# Patient Record
Sex: FEMALE | Race: WHITE | NOT HISPANIC OR LATINO | Employment: STUDENT | URBAN - METROPOLITAN AREA
[De-identification: names, ages, dates, MRNs, and addresses within clinical notes are randomized per-mention and may not be internally consistent; named-entity substitution may affect disease eponyms.]

---

## 2021-01-18 ENCOUNTER — IMMUNIZATIONS (OUTPATIENT)
Dept: FAMILY MEDICINE CLINIC | Facility: HOSPITAL | Age: 21
End: 2021-01-18

## 2021-01-18 DIAGNOSIS — Z23 ENCOUNTER FOR IMMUNIZATION: Primary | ICD-10-CM

## 2021-01-18 PROCEDURE — 91300 SARS-COV-2 / COVID-19 MRNA VACCINE (PFIZER-BIONTECH) 30 MCG: CPT

## 2021-01-18 PROCEDURE — 0001A SARS-COV-2 / COVID-19 MRNA VACCINE (PFIZER-BIONTECH) 30 MCG: CPT

## 2021-02-08 ENCOUNTER — IMMUNIZATIONS (OUTPATIENT)
Dept: FAMILY MEDICINE CLINIC | Facility: HOSPITAL | Age: 21
End: 2021-02-08

## 2021-02-08 DIAGNOSIS — Z23 ENCOUNTER FOR IMMUNIZATION: Primary | ICD-10-CM

## 2021-02-08 PROCEDURE — 91300 SARS-COV-2 / COVID-19 MRNA VACCINE (PFIZER-BIONTECH) 30 MCG: CPT

## 2021-02-08 PROCEDURE — 0002A SARS-COV-2 / COVID-19 MRNA VACCINE (PFIZER-BIONTECH) 30 MCG: CPT

## 2022-03-22 ENCOUNTER — OFFICE VISIT (OUTPATIENT)
Dept: URGENT CARE | Facility: CLINIC | Age: 22
End: 2022-03-22
Payer: COMMERCIAL

## 2022-03-22 VITALS
OXYGEN SATURATION: 99 % | TEMPERATURE: 98.2 F | RESPIRATION RATE: 18 BRPM | BODY MASS INDEX: 23.3 KG/M2 | SYSTOLIC BLOOD PRESSURE: 107 MMHG | HEART RATE: 75 BPM | HEIGHT: 66 IN | WEIGHT: 145 LBS | DIASTOLIC BLOOD PRESSURE: 62 MMHG

## 2022-03-22 DIAGNOSIS — S01.339A COMPLICATION OF EAR PIERCING, UNSPECIFIED LATERALITY, INITIAL ENCOUNTER: Primary | ICD-10-CM

## 2022-03-22 PROCEDURE — 99213 OFFICE O/P EST LOW 20 MIN: CPT | Performed by: PHYSICIAN ASSISTANT

## 2022-03-22 RX ORDER — DEXTROAMPHETAMINE SACCHARATE, AMPHETAMINE ASPARTATE, DEXTROAMPHETAMINE SULFATE AND AMPHETAMINE SULFATE 1.25; 1.25; 1.25; 1.25 MG/1; MG/1; MG/1; MG/1
TABLET ORAL
COMMUNITY
Start: 2022-03-11

## 2022-03-22 RX ORDER — NORETHINDRONE ACETATE AND ETHINYL ESTRADIOL, ETHINYL ESTRADIOL AND FERROUS FUMARATE 1MG-10(24)
KIT ORAL
COMMUNITY
Start: 2022-01-21

## 2022-03-22 RX ORDER — CEPHALEXIN 500 MG/1
500 CAPSULE ORAL EVERY 8 HOURS SCHEDULED
Qty: 21 CAPSULE | Refills: 0 | Status: SHIPPED | OUTPATIENT
Start: 2022-03-22 | End: 2022-03-29

## 2022-03-22 RX ORDER — KETOCONAZOLE 20 MG/ML
SHAMPOO TOPICAL
COMMUNITY
Start: 2022-01-25

## 2022-03-22 RX ORDER — LISDEXAMFETAMINE DIMESYLATE 30 MG/1
30 CAPSULE ORAL EVERY MORNING
COMMUNITY
Start: 2022-02-26

## 2022-03-25 NOTE — PROGRESS NOTES
[facility]        NAME: Yudelka Powers is a 24 y o  female  : 2000    MRN: [id number]  DATE:  2022  TIME: 9:15 AM    Assessment and Plan   Complication of ear piercing, unspecified laterality, initial encounter [S01 339A]  1  Complication of ear piercing, unspecified laterality, initial encounter  mupirocin (BACTROBAN) 2 % ointment    cephalexin (KEFLEX) 500 mg capsule         Patient Instructions     Patient appears to have ear piercing infection bilaterally  I prescribed her oral and topical antibiotics  If condition does not resolve with treatment, then she may need to remove piercings  Should be re-evaluated if any further complications arise  Follow up with PCP in 3-5 days  Proceed to  ER if symptoms worsen  Chief Complaint     Chief Complaint   Patient presents with    Ear Swelling     started 1 week ago, b/l ears, possible infected pericing, slight redness and swelling, lumps noticed n back of ears, no draiange, pt is cleaning with antiseptic washes, no internal pain, slightly itchy at this time on outside of ears          History of Present Illness       Patient presents with one-week history of which she reports are lumps behind her ears that are red, tender, and swollen  She is concerned about possible piercing infection  She has had the piercings for a while  She denies fever chills or any other significant symptoms at this time  She has been using an antiseptic wash to clean the area  Review of Systems   Review of Systems   Constitutional: Negative  HENT:        Bilateral external ear swelling   Respiratory: Negative  Cardiovascular: Negative  Gastrointestinal: Negative  Genitourinary: Negative            Current Medications       Current Outpatient Medications:     amphetamine-dextroamphetamine (ADDERALL) 5 MG tablet, , Disp: , Rfl:     Lo Loestrin Fe 1 MG-10 MCG / 10 MCG TABS, , Disp: , Rfl:     Vyvanse 30 MG capsule, Take 30 mg by mouth every morning, Disp: , Rfl:     cephalexin (KEFLEX) 500 mg capsule, Take 1 capsule (500 mg total) by mouth every 8 (eight) hours for 7 days, Disp: 21 capsule, Rfl: 0    ketoconazole (NIZORAL) 2 % shampoo, USE IN SHOWER DAILY (Patient not taking: Reported on 3/22/2022), Disp: , Rfl:     mupirocin (BACTROBAN) 2 % ointment, Apply topically 3 (three) times a day, Disp: 22 g, Rfl: 0    Current Allergies     Allergies as of 03/22/2022 - Reviewed 03/22/2022   Allergen Reaction Noted    Vancomycin Rash 06/10/2021            The following portions of the patient's history were reviewed and updated as appropriate: allergies, current medications, past family history, past medical history, past social history, past surgical history and problem list      Past Medical History:   Diagnosis Date    ADHD (attention deficit hyperactivity disorder)        History reviewed  No pertinent surgical history  No family history on file  Medications have been verified  Objective   /62   Pulse 75   Temp 98 2 °F (36 8 °C) (Tympanic)   Resp 18   Ht 5' 6" (1 676 m)   Wt 65 8 kg (145 lb)   SpO2 99%   BMI 23 40 kg/m²   No LMP recorded  Physical Exam     Physical Exam  Vitals reviewed  Constitutional:       General: She is not in acute distress  Appearance: She is well-developed  HENT:      Ears:      Comments: Bilateral posterior external ears with swelling and erythema around to distinct ear piercings  There is no active drainage or bleeding noted  Remainder of ear exam is benign  Neurological:      Mental Status: She is alert and oriented to person, place, and time